# Patient Record
Sex: FEMALE | Race: WHITE | Employment: FULL TIME | URBAN - METROPOLITAN AREA
[De-identification: names, ages, dates, MRNs, and addresses within clinical notes are randomized per-mention and may not be internally consistent; named-entity substitution may affect disease eponyms.]

---

## 2017-07-11 ENCOUNTER — HOSPITAL ENCOUNTER (OUTPATIENT)
Dept: CT IMAGING | Age: 62
Discharge: HOME | End: 2017-07-11
Attending: SPECIALIST
Payer: COMMERCIAL

## 2017-07-11 DIAGNOSIS — J32.0 CHRONIC MAXILLARY SINUSITIS: ICD-10-CM

## 2017-07-11 DIAGNOSIS — R05.9 COUGH: ICD-10-CM

## 2017-07-11 PROCEDURE — 70486 CT MAXILLOFACIAL W/O DYE: CPT

## 2017-08-01 ENCOUNTER — HOSPITAL ENCOUNTER (OUTPATIENT)
Dept: GENERAL RADIOLOGY | Age: 62
Discharge: HOME | End: 2017-08-01
Payer: COMMERCIAL

## 2017-08-01 DIAGNOSIS — R93.89 CHEST X-RAY ABNORMALITY: ICD-10-CM

## 2017-08-01 PROCEDURE — 71020 XR CHEST PA LAT: CPT

## 2018-07-30 ENCOUNTER — HOSPITAL ENCOUNTER (OUTPATIENT)
Dept: GENERAL RADIOLOGY | Age: 63
Discharge: HOME | End: 2018-07-30
Payer: COMMERCIAL

## 2018-07-30 DIAGNOSIS — C64.9 RENAL CELL CARCINOMA (HCC): ICD-10-CM

## 2018-07-30 PROCEDURE — 71046 X-RAY EXAM CHEST 2 VIEWS: CPT

## 2022-01-13 ENCOUNTER — TRANSCRIBE ORDER (OUTPATIENT)
Dept: SCHEDULING | Age: 67
End: 2022-01-13

## 2022-01-13 DIAGNOSIS — I48.0 PAROXYSMAL ATRIAL FIBRILLATION (HCC): Primary | ICD-10-CM

## 2022-02-09 ENCOUNTER — HOSPITAL ENCOUNTER (OUTPATIENT)
Dept: MRI IMAGING | Age: 67
Discharge: HOME | End: 2022-02-09
Attending: NURSE PRACTITIONER
Payer: COMMERCIAL

## 2022-02-09 VITALS — WEIGHT: 187 LBS

## 2022-02-09 DIAGNOSIS — I48.0 PAROXYSMAL ATRIAL FIBRILLATION (HCC): ICD-10-CM

## 2022-02-09 PROCEDURE — A9576 INJ PROHANCE MULTIPACK: HCPCS | Performed by: INTERNAL MEDICINE

## 2022-02-09 PROCEDURE — 77030021566

## 2022-02-09 PROCEDURE — 74011250636 HC RX REV CODE- 250/636: Performed by: INTERNAL MEDICINE

## 2022-02-09 PROCEDURE — 75561 CARDIAC MRI FOR MORPH W/DYE: CPT

## 2022-02-09 RX ADMIN — GADOTERIDOL 25 ML: 279.3 INJECTION, SOLUTION INTRAVENOUS at 11:43

## 2023-04-18 PROBLEM — I48.19 PERSISTENT ATRIAL FIBRILLATION (HCC): Status: ACTIVE | Noted: 2023-04-18

## 2023-04-19 ENCOUNTER — OFFICE VISIT (OUTPATIENT)
Dept: CARDIOLOGY CLINIC | Age: 68
End: 2023-04-19

## 2023-04-19 VITALS
OXYGEN SATURATION: 98 % | SYSTOLIC BLOOD PRESSURE: 110 MMHG | WEIGHT: 196.4 LBS | HEIGHT: 69 IN | BODY MASS INDEX: 29.09 KG/M2 | HEART RATE: 86 BPM | DIASTOLIC BLOOD PRESSURE: 80 MMHG | RESPIRATION RATE: 18 BRPM

## 2023-04-19 DIAGNOSIS — Z79.01 ANTICOAGULATION ADEQUATE: ICD-10-CM

## 2023-04-19 DIAGNOSIS — Z86.73 HISTORY OF CVA (CEREBROVASCULAR ACCIDENT): ICD-10-CM

## 2023-04-19 DIAGNOSIS — G71.00 MUSCULAR DYSTROPHY (HCC): ICD-10-CM

## 2023-04-19 DIAGNOSIS — I48.19 PERSISTENT ATRIAL FIBRILLATION (HCC): ICD-10-CM

## 2023-04-19 RX ORDER — LYSINE HCL 500 MG
1 TABLET ORAL DAILY
COMMUNITY

## 2023-04-19 RX ORDER — CYCLOSPORINE 0.5 MG/ML
1 EMULSION OPHTHALMIC 2 TIMES DAILY
COMMUNITY

## 2023-04-19 RX ORDER — GLUCOSAMINE SULFATE 1500 MG
POWDER IN PACKET (EA) ORAL DAILY
COMMUNITY

## 2023-04-19 RX ORDER — FLUTICASONE PROPIONATE 50 MCG
1 SPRAY, SUSPENSION (ML) NASAL AS NEEDED
COMMUNITY

## 2023-04-19 RX ORDER — ALENDRONATE SODIUM 70 MG/1
70 TABLET ORAL
COMMUNITY
Start: 2023-02-25

## 2023-04-19 RX ORDER — RIVAROXABAN 20 MG/1
20 TABLET, FILM COATED ORAL DAILY
COMMUNITY
Start: 2023-04-05

## 2023-04-19 RX ORDER — DICLOFENAC SODIUM 10 MG/G
4 GEL TOPICAL DAILY PRN
COMMUNITY

## 2023-04-19 RX ORDER — ATORVASTATIN CALCIUM 20 MG/1
20 TABLET, FILM COATED ORAL DAILY
COMMUNITY
Start: 2023-03-17

## 2023-04-19 RX ORDER — AZELASTINE 1 MG/ML
1 SPRAY, METERED NASAL 2 TIMES DAILY
COMMUNITY

## 2023-04-19 RX ORDER — MONTELUKAST SODIUM 10 MG/1
10 TABLET ORAL DAILY
COMMUNITY
Start: 2023-02-07

## 2023-04-19 NOTE — PATIENT INSTRUCTIONS
Obtain 1 week event monitor in May  Continue Xarelto 20 mg daily  Please call us if you are interested in the Watchman procedure  FU with Dr. Sadiq Smart in 6 months

## 2023-04-19 NOTE — PROGRESS NOTES
Room #: 2    C/o fatigue and shortness of breath    Chief Complaint   Patient presents with    New Patient     From [facility]    Irregular Heart Beat     AFIB    Cerebrovascular Accident       Visit Vitals  /80 (BP 1 Location: Left upper arm, BP Patient Position: Sitting, BP Cuff Size: Adult)   Pulse 86   Resp 18   Ht 5' 9\" (1.753 m)   Wt 196 lb 6.4 oz (89.1 kg)   LMP 01/18/2012   SpO2 98%   BMI 29.00 kg/m²         Chest pain:  NO  Shortness of breath:  YES  Edema: NO  Palpitations, skipped beats, rapid heartbeat:  Rarely  Dizziness:  NO    1. Have you been to the ER, urgent care clinic since your last visit? Hospitalized since your last visit? NO    2. Have you seen or consulted any other health care providers outside of the [address] [place] [address] since your last visit? Include any pap smears or colon screening.  NO      Refills:  NO

## 2023-04-19 NOTE — PROGRESS NOTES
Cardiac Electrophysiology OFFICE Consultation Note       Assessment/Plan:   1. Persistent atrial fibrillation (HCC)  -     AMB POC EKG ROUTINE W/ 12 LEADS, INTER & REP  -     AMB POC EKG ROUTINE W/ 12 LEADS, INTER & REP  2. Muscular dystrophy ([address]  3. History of CVA (cerebrovascular accident)  4. Anticoagulation adequate       Persistent atrial fibrillation  History of persistent atrial fibrillation complicated by acute CVA. Unclear duration of A-fib prior to hospitalization in January 2022. She ultimately had ongoing breakthrough arrhythmia despite flecainide therapy and eventually amiodarone therapy. - Underwent PVI, CTI ablation on 6/21/2022  - Bradycardia and Wenkebach limits long-term beta-blockade  - I'm pleased to hear how well the patient has done post ablation. We spoke in great detail regarding the importance of multidisciplinary management of AFib and the role of risk factors modification in preventing recurrent AF including focusing on diet, weight loss, control of blood pressure, glycemic control, AURORA diagnosis and treatment, and medication compliance. - No longer on amiodarone since 9/22/2022  - Prior sleep study negative for sleep apnea  - Continue Xarelto 20 mg daily. She is dealing with the high cost of Xarelto as well as in the history of muscular dystrophy, elevated risk of falls  - Idea of watchman was explained to the patient in great details and a pamphlet was provided  - She can call us if she wants to proceed with watchman implant  - Obtain 1 week event monitor in May  - Follow-up with me in 6 months    Anticoagulation  On Xarelto for thromboembolic prophylaxis. CHADS-VASc score of 4 (CVA, female, Age>65)  -Will need indefinite anticoagulation unless she proceed with watchman implant    Muscular dystrophy  Well-controlled.   No evidence of dilated cardiomyopathy from cardiac MRI done in 2022    History of CVA  Secondary to atrial fibrillation fortunate to not have significant residual deficits status post CVA and tPA  - Continue Xarelto for thromboembolic prophylaxis    Subjective:       Lori Brock is a 74 y.o. patient who is seen for evaluation of  AF. Prior patient of [facility], transitioning care to [facility]. She has a prior history of CVA status post thrombolysis in January 2022. History of persistent atrial fibrillation status post prior PVI, CTI and epicardial PV connection ablation in the right lesley on 6/21/2022. She otherwise has been doing quite well. Walks with a walker due to her muscular dystrophy. Denies of any chest pain. Reports occasional shortness of breath with exertion, LVEF last noted to be 55% last year. Occasional nighttime palpitations. No longer on antiarrhythmic therapy. She is dealing with high cost of Xarelto. I independently review internal and external records of most recent labs, EKGs, event monitors or Holters, and imaging including most recent echocardiograms and stress test.   -S records reviewed  - Acute CVA underwent thrombolysis in 2022  - Cardiac MRI demonstrated LVEF of 55%, mild mitral regurgitation, mild to moderate tricuspid regurgitation, evidence of late gadolinium enhancement in the focal area of subendocardial basal inferior lateral, nonischemic. Patient Active Problem List   Diagnosis Code    Renal mass, right N28.89    Persistent atrial fibrillation (HCC) I48.19    Muscular dystrophy ([facility] [address] G71.00    History of CVA (cerebrovascular accident) Z86.73    Anticoagulation adequate Z79.01     Current Outpatient Medications   Medication Sig Dispense Refill    atorvastatin (LIPITOR) 20 mg tablet Take 1 Tablet by mouth daily. azelastine (ASTELIN) 137 mcg (0.1 %) nasal spray 1 Spray two (2) times a day. montelukast (SINGULAIR) 10 mg tablet Take 1 Tablet by mouth daily. Xarelto 20 mg tab tablet Take 1 Tablet by mouth daily. cycloSPORINE (RESTASIS) 0.05 % dpet Administer 1 Drop to both eyes two (2) times a day. psyllium (METAMUCIL) powd Take 1 Scoop by mouth daily. TURMERIC PO Take 1,000 mg by mouth daily. diclofenac (VOLTAREN) 1 % gel Apply 4 g to affected area daily as needed. Lactobacillus acidophilus (PROBIOTIC PO) Take 1 Capsule by mouth daily. cholecalciferol (VITAMIN D3) 25 mcg (1,000 unit) cap Take  by mouth daily. MULTIVITAMIN WITH MINERALS (ONE-A-DAY 50 PLUS PO) Take 1 Cap by mouth daily. acetaminophen (TYLENOL) 500 mg tablet Take 650 mg by mouth every six (6) hours as needed. fluticasone propionate (FLONASE) 50 mcg/actuation nasal spray 1 Spray by Nasal route as needed. Calcium Carbonate-Vit D3-Min 600 mg calcium- 400 unit tab Take 1 Tablet by mouth daily. alendronate (FOSAMAX) 70 mg tablet Take 1 Tablet by mouth every seven (7) days. Allergies   Allergen Reactions    Other Medication Itching     Crab   States she is able to tolerate iodine contrast    Sulfa (Sulfonamide Antibiotics) Itching     Past Medical History:   Diagnosis Date    Anomaly of coronary artery     LAD and left circumflex arterhy have an anomolous origin from the right coronary cusp    Endometriosis     Muscular dystrophy ([address]     [facility], mild per patient. Dry eyes    Unspecified adverse effect of anesthesia     post op orthostasis     Past Surgical History:   Procedure Laterality Date    HX GYN      laparoscopy x 2 for endometriosis    HX OTHER SURGICAL      muscle biopsy left thigh    OR UNLISTED PROCEDURE CARDIAC SURGERY  12/2008    cardiac cath neg     Family History   Problem Relation Age of Onset    Hypertension Mother     Elevated Lipids Mother     Hypertension Father     Elevated Lipids Father      Social History     Tobacco Use    Smoking status: Former     Packs/day: 0.25     Years: 0.50     Pack years: 0.13     Types: Cigarettes    Smokeless tobacco: Not on file   Substance Use Topics    Alcohol use:  Yes     Alcohol/week: 0.8 standard drinks     Types: 1 Glasses of wine per week     Comment: once/month        Review of Systems:   12 point review of systems was performed. All negative except for HPI     Objective:   /80 (BP 1 Location: Left upper arm, BP Patient Position: Sitting, BP Cuff Size: Adult)   Pulse 86   Resp 18   Ht 5' 9\" (1.753 m)   Wt 196 lb 6.4 oz (89.1 kg)   LMP 01/18/2012   SpO2 98%   BMI 29.00 kg/m²     Physical Exam:   General:  Alert and oriented, in no acute distress  Head:  Atraumatic, normocephalic  Eyes:  extraocular muscles intact  Neck:  Supple, normal range of motion  Lungs:  Clear to auscultation bilaterally, no wheezes/rales/rhonchi   Cardiovascular:  Regular rate and rhythm, normal S1-S2, no murmurs/rubs/gallops  Abdomen:  Soft, nontender, nondistended, normoactive bowel sounds  Skin:  Intact, no rash  Extremities:, no clubbing, cyanosis, or edema  Musculoskeletal: normal range of motion  Neurological:  Alert and oriented, no focal neurologic deficits  Psychiatric:  Normal mood and affect    No results found for: HBA1C, WIX9OBHG, NRE6NTIR, FMU5HZDL  EKG: sinus rhythm, normal axis, rate of 76 bpm, Qtc 426 ms        Results for orders placed or performed during the hospital encounter of 01/26/12   EKG, 12 LEAD, INITIAL   Result Value Ref Range    Ventricular Rate 82 BPM    Atrial Rate 82 BPM    P-R Interval 238 ms    QRS Duration 74 ms    Q-T Interval 358 ms    QTC Calculation (Bezet) 418 ms    Calculated P Axis 73 degrees    Calculated R Axis 69 degrees    Calculated T Axis 76 degrees    Diagnosis       Sinus rhythm with 1st degree AV block  Biatrial enlargement  ST abnormality, possible digitalis effect  Abnormal ECG  No previous ECGs available  Confirmed by Asya CHAVES, Irene Luis ([id number]) on 1/26/2012 5:43:03 PM             Thank you for involving me in this patient's care and please call with further concerns or questions.       ________________________________________  An Dom Barr MD, [facility], [facility]  Cardiac Electrophysiology  [facility]  [address] [facility], [address] [place] [address] Avenue                             [phone]     [address] [place] [address].  [address] [place] [address], [address] [facility]  [phone]

## 2023-04-25 ENCOUNTER — TELEPHONE (OUTPATIENT)
Dept: CARDIOLOGY CLINIC | Age: 68
End: 2023-04-25

## 2023-04-25 NOTE — TELEPHONE ENCOUNTER
Enrolled with [employer] - Ordered and being shipped to patient's home address on file. ETA within 5-7 business days.     Previous Messages       ----- Message -----   From: Gerber Pappas RN   Sent: 4/19/2023   9:08 AM EDT   To: Roxanne Álvarez     1 week event monitor in May please :) - AFIB

## 2023-05-25 ENCOUNTER — TELEPHONE (OUTPATIENT)
Age: 68
End: 2023-05-25

## 2023-05-26 ENCOUNTER — PATIENT MESSAGE (OUTPATIENT)
Age: 68
End: 2023-05-26

## 2023-05-26 NOTE — TELEPHONE ENCOUNTER
4 Day Event monitor (5/2/23-5/6/23)  HR beween  bpm  Significant atrial flutter seen with burden of 56.9%, longest episode lasting for 2 days and 1 hour. 67 episodes of pause seen, longest duration of 2.6 seconds. Occasional PAC of 1.85%  Rare PVCs 0.18%  No High grade AV block seen  No VT      Results will be given in clinic.

## 2023-05-30 NOTE — TELEPHONE ENCOUNTER
An MD Georgina Yanez, RN; [facility]  [facility],     It looks like her monitor demonstrated evidence of atrial flutter. Can we bring her into clinic in June to discuss this further, thanks. Returned patient call, ID verified using two patient identifiers. Notified of above results and recommendations. Follow up appointment scheduled. Patient verbalized understanding and will call back with any other questions or concerns.     Future Appointments   Date Time Provider Pio Vasquez   6/14/2023  8:40 AM An MD BUTCH Yanez AMB   10/20/2023  8:20 AM An MD BUTCH Yanez AMB

## 2023-06-14 ENCOUNTER — TELEPHONE (OUTPATIENT)
Age: 68
End: 2023-06-14

## 2023-06-14 NOTE — TELEPHONE ENCOUNTER
Spoke with Pt of Afib/Aflutter Ablation w/Dr. Yamilet Villalba at [facility]. For 8/30/23 at 8am arrive at 6:15am  Pt aware that they need a  NPO from [address] the night before. Check in at the First floor Outpt. Reg. Desk. Pt is to have Labs done on 7/30/23-8/23/23.  Medications:  Medications are ok to take    VO by /nurse Yane Jamil.

## 2023-07-05 ENCOUNTER — TELEPHONE (OUTPATIENT)
Age: 68
End: 2023-07-05

## 2023-07-05 RX ORDER — RIVAROXABAN 20 MG/1
20 TABLET, FILM COATED ORAL
Qty: 90 TABLET | Refills: 1 | Status: SHIPPED | OUTPATIENT
Start: 2023-07-05

## 2023-07-05 NOTE — TELEPHONE ENCOUNTER
Requested Prescriptions     Signed Prescriptions Disp Refills    XARELTO 20 MG TABS tablet 90 tablet 1     Sig: Take 1 tablet by mouth Daily with supper     Authorizing Provider: NANCY ALVARADO     Ordering User: Tree Patel     Refill per verbal order Dr. Garry Yanez.    Last visit:6/14/23  Next visit: 10/6/23

## 2023-07-05 NOTE — TELEPHONE ENCOUNTER
Pt called for refill of Xarelto 20mg, she's low & will not make it through the weekend. Her pharmacy is [facility].       [facility]# [phone]

## 2023-07-19 ENCOUNTER — PREP FOR PROCEDURE (OUTPATIENT)
Age: 68
End: 2023-07-19

## 2023-07-20 RX ORDER — SODIUM CHLORIDE 0.9 % (FLUSH) 0.9 %
5-40 SYRINGE (ML) INJECTION EVERY 12 HOURS SCHEDULED
Status: CANCELLED | OUTPATIENT
Start: 2023-07-20

## 2023-07-20 RX ORDER — SODIUM CHLORIDE 9 MG/ML
INJECTION, SOLUTION INTRAVENOUS PRN
Status: CANCELLED | OUTPATIENT
Start: 2023-07-20

## 2023-07-20 RX ORDER — SODIUM CHLORIDE 0.9 % (FLUSH) 0.9 %
5-40 SYRINGE (ML) INJECTION PRN
Status: CANCELLED | OUTPATIENT
Start: 2023-07-20

## 2023-08-11 LAB
BASOPHILS # BLD AUTO: 0.1 X10E3/UL (ref 0–0.2)
BASOPHILS NFR BLD AUTO: 1 %
BUN SERPL-MCNC: 22 MG/DL (ref 8–27)
BUN/CREAT SERPL: 35 (ref 12–28)
CALCIUM SERPL-MCNC: 9.6 MG/DL (ref 8.7–10.3)
CHLORIDE SERPL-SCNC: 106 MMOL/L (ref 96–106)
CO2 SERPL-SCNC: 22 MMOL/L (ref 20–29)
CREAT SERPL-MCNC: 0.62 MG/DL (ref 0.57–1)
EGFRCR SERPLBLD CKD-EPI 2021: 99 ML/MIN/1.73
EOSINOPHIL # BLD AUTO: 0.2 X10E3/UL (ref 0–0.4)
EOSINOPHIL NFR BLD AUTO: 2 %
ERYTHROCYTE [DISTWIDTH] IN BLOOD BY AUTOMATED COUNT: 12.9 % (ref 11.7–15.4)
GLUCOSE SERPL-MCNC: 90 MG/DL (ref 70–99)
HCT VFR BLD AUTO: 40.7 % (ref 34–46.6)
HGB BLD-MCNC: 13.4 G/DL (ref 11.1–15.9)
IMM GRANULOCYTES # BLD AUTO: 0.1 X10E3/UL (ref 0–0.1)
IMM GRANULOCYTES NFR BLD AUTO: 1 %
LYMPHOCYTES # BLD AUTO: 2.5 X10E3/UL (ref 0.7–3.1)
LYMPHOCYTES NFR BLD AUTO: 22 %
MCH RBC QN AUTO: 28 PG (ref 26.6–33)
MCHC RBC AUTO-ENTMCNC: 32.9 G/DL (ref 31.5–35.7)
MCV RBC AUTO: 85 FL (ref 79–97)
MONOCYTES # BLD AUTO: 1 X10E3/UL (ref 0.1–0.9)
MONOCYTES NFR BLD AUTO: 8 %
NEUTROPHILS # BLD AUTO: 7.5 X10E3/UL (ref 1.4–7)
NEUTROPHILS NFR BLD AUTO: 66 %
PLATELET # BLD AUTO: 350 X10E3/UL (ref 150–450)
POTASSIUM SERPL-SCNC: 5 MMOL/L (ref 3.5–5.2)
RBC # BLD AUTO: 4.78 X10E6/UL (ref 3.77–5.28)
SODIUM SERPL-SCNC: 143 MMOL/L (ref 134–144)
WBC # BLD AUTO: 11.3 X10E3/UL (ref 3.4–10.8)

## 2023-08-28 NOTE — DISCHARGE INSTRUCTIONS
Atrial Fibrillation Ablation   Discharge Instructions      You have just had an Atrial Fibrillation Ablation. There were catheters temporarily placed in your heart through a puncture in the veins and/or arteries in your groin. WHAT TO EXPECT     If you have had an Atrial Fibrillation Ablation please be aware that you may experience mild chest pain that will resolve within 24-48 hours. If the chest pain persists or becomes severe, please call the office. Mild to moderate, non-painful, bruising or mild swelling at the puncture site is not un-common, and will resolve in 7 - 14 days, and may extend down your thigh as it heals. Application of Ice to the site may help with any tenderness. You have a small gauze dressing applied to the puncture site in your groin. You may remove this the following morning. It is not uncommon to feel palpitations during the healing phase after your ablation. If you feel as though you are having recurrence of atrial fibrillation lasting longer than 30 minutes, please contact the office. Palpitations/AFIB can occur during the healing phase (1-2 months) post ablation. MEDICATIONS     Stop Nexium & start pantoprazole 40 mg by mouth twice daily x 30 days & Carafate 1g by mouth 4 times daily x 30 days, then restart Nexium. Start amiodarone 400 mg by mouth daily x 2 weeks, then 200 mg by mouth daily thereafter. If you develop chest pain, you may start colchicine 0.6 mg by mouth twice daily as needed. Resume other medications as previously prescribed. ACTIVITY     A responsible adult must take you home. Do not drive a car for 24 hours. Rest quietly for the remainder of the day. Do not lift anything greater than 10 pounds for 7 days. Limit bending at the puncture site and use of stairs for at least 2 days. You may remove the bandage and shower the morning after the procedure. Do not take a bath for 3 days.         SYMPTOMS THAT NEED TO BE REPORTED

## 2023-08-29 ENCOUNTER — TELEPHONE (OUTPATIENT)
Age: 68
End: 2023-08-29

## 2023-08-29 ENCOUNTER — ANESTHESIA EVENT (OUTPATIENT)
Facility: HOSPITAL | Age: 68
End: 2023-08-29
Payer: MEDICARE

## 2023-08-29 NOTE — TELEPHONE ENCOUNTER
Spoke To Pt:  Just a reminder not to forget your Afib Ablation at [facility] w/Dr. Ally Gallardo scheduled for tomorrow. Arriving at 6:15am  You will need a    NPO from midnight   check in at the first floor outpt. reg. Desk.   Medications:  Ok to take  VO by [facility].

## 2023-08-30 ENCOUNTER — HOSPITAL ENCOUNTER (OUTPATIENT)
Facility: HOSPITAL | Age: 68
Discharge: HOME | End: 2023-08-30
Attending: INTERNAL MEDICINE | Admitting: INTERNAL MEDICINE
Payer: MEDICARE

## 2023-08-30 ENCOUNTER — ANESTHESIA (OUTPATIENT)
Facility: HOSPITAL | Age: 68
End: 2023-08-30
Payer: MEDICARE

## 2023-08-30 VITALS
HEIGHT: 69 IN | TEMPERATURE: 97.7 F | DIASTOLIC BLOOD PRESSURE: 86 MMHG | SYSTOLIC BLOOD PRESSURE: 123 MMHG | OXYGEN SATURATION: 100 % | RESPIRATION RATE: 13 BRPM | HEART RATE: 65 BPM | BODY MASS INDEX: 28.14 KG/M2 | WEIGHT: 190 LBS

## 2023-08-30 DIAGNOSIS — I48.91 ATRIAL FIBRILLATION, UNSPECIFIED TYPE (HCC): ICD-10-CM

## 2023-08-30 LAB
ABO + RH BLD: NORMAL
ACT BLD: 323 SECS (ref 79–138)
ACT BLD: 329 SECS (ref 79–138)
ACT BLD: 342 SECS (ref 79–138)
ACT BLD: 354 SECS (ref 79–138)
BLOOD GROUP ANTIBODIES SERPL: NORMAL
ECHO BSA: 2.05 M2
EKG ATRIAL RATE: 67 BPM
EKG DIAGNOSIS: NORMAL
EKG P AXIS: 77 DEGREES
EKG P-R INTERVAL: 226 MS
EKG Q-T INTERVAL: 450 MS
EKG QRS DURATION: 80 MS
EKG QTC CALCULATION (BAZETT): 475 MS
EKG R AXIS: 63 DEGREES
EKG T AXIS: 93 DEGREES
EKG VENTRICULAR RATE: 67 BPM
SPECIMEN EXP DATE BLD: NORMAL

## 2023-08-30 PROCEDURE — 86900 BLOOD TYPING SEROLOGIC ABO: CPT

## 2023-08-30 PROCEDURE — 85347 COAGULATION TIME ACTIVATED: CPT

## 2023-08-30 PROCEDURE — 93622 COMP EP EVAL L VENTR PAC&REC: CPT | Performed by: INTERNAL MEDICINE

## 2023-08-30 PROCEDURE — 3700000000 HC ANESTHESIA ATTENDED CARE: Performed by: INTERNAL MEDICINE

## 2023-08-30 PROCEDURE — 92960 CARDIOVERSION ELECTRIC EXT: CPT | Performed by: INTERNAL MEDICINE

## 2023-08-30 PROCEDURE — 2580000003 HC RX 258: Performed by: ANESTHESIOLOGY

## 2023-08-30 PROCEDURE — 6360000002 HC RX W HCPCS: Performed by: ANESTHESIOLOGY

## 2023-08-30 PROCEDURE — 93656 COMPRE EP EVAL ABLTJ ATR FIB: CPT | Performed by: INTERNAL MEDICINE

## 2023-08-30 PROCEDURE — 93655 ICAR CATH ABLTJ DSCRT ARRHYT: CPT | Performed by: INTERNAL MEDICINE

## 2023-08-30 PROCEDURE — 93657 TX L/R ATRIAL FIB ADDL: CPT | Performed by: INTERNAL MEDICINE

## 2023-08-30 PROCEDURE — C1894 INTRO/SHEATH, NON-LASER: HCPCS | Performed by: INTERNAL MEDICINE

## 2023-08-30 PROCEDURE — 2709999900 HC NON-CHARGEABLE SUPPLY: Performed by: INTERNAL MEDICINE

## 2023-08-30 PROCEDURE — 93662 INTRACARDIAC ECG (ICE): CPT | Performed by: INTERNAL MEDICINE

## 2023-08-30 PROCEDURE — 76937 US GUIDE VASCULAR ACCESS: CPT | Performed by: INTERNAL MEDICINE

## 2023-08-30 PROCEDURE — 86901 BLOOD TYPING SEROLOGIC RH(D): CPT

## 2023-08-30 PROCEDURE — 3700000001 HC ADD 15 MINUTES (ANESTHESIA): Performed by: INTERNAL MEDICINE

## 2023-08-30 PROCEDURE — 36415 COLL VENOUS BLD VENIPUNCTURE: CPT

## 2023-08-30 PROCEDURE — 86850 RBC ANTIBODY SCREEN: CPT

## 2023-08-30 PROCEDURE — C1759 CATH, INTRA ECHOCARDIOGRAPHY: HCPCS | Performed by: INTERNAL MEDICINE

## 2023-08-30 PROCEDURE — C1732 CATH, EP, DIAG/ABL, 3D/VECT: HCPCS | Performed by: INTERNAL MEDICINE

## 2023-08-30 PROCEDURE — C1760 CLOSURE DEV, VASC: HCPCS | Performed by: INTERNAL MEDICINE

## 2023-08-30 PROCEDURE — 2720000010 HC SURG SUPPLY STERILE: Performed by: INTERNAL MEDICINE

## 2023-08-30 PROCEDURE — 93005 ELECTROCARDIOGRAM TRACING: CPT | Performed by: NURSE PRACTITIONER

## 2023-08-30 PROCEDURE — C1766 INTRO/SHEATH,STRBLE,NON-PEEL: HCPCS | Performed by: INTERNAL MEDICINE

## 2023-08-30 PROCEDURE — 93623 PRGRMD STIMJ&PACG IV RX NFS: CPT | Performed by: INTERNAL MEDICINE

## 2023-08-30 PROCEDURE — 93613 INTRACARDIAC EPHYS 3D MAPG: CPT | Performed by: INTERNAL MEDICINE

## 2023-08-30 PROCEDURE — 2500000003 HC RX 250 WO HCPCS: Performed by: ANESTHESIOLOGY

## 2023-08-30 RX ORDER — SODIUM CHLORIDE 0.9 % (FLUSH) 0.9 %
5-40 SYRINGE (ML) INJECTION PRN
Status: DISCONTINUED | OUTPATIENT
Start: 2023-08-30 | End: 2023-08-30 | Stop reason: HOSPADM

## 2023-08-30 RX ORDER — FENTANYL CITRATE 50 UG/ML
100 INJECTION, SOLUTION INTRAMUSCULAR; INTRAVENOUS
Status: DISCONTINUED | OUTPATIENT
Start: 2023-08-30 | End: 2023-08-30 | Stop reason: HOSPADM

## 2023-08-30 RX ORDER — SODIUM CHLORIDE 9 MG/ML
INJECTION, SOLUTION INTRAVENOUS PRN
Status: DISCONTINUED | OUTPATIENT
Start: 2023-08-30 | End: 2023-08-30 | Stop reason: HOSPADM

## 2023-08-30 RX ORDER — ONDANSETRON 2 MG/ML
4 INJECTION INTRAMUSCULAR; INTRAVENOUS
Status: DISCONTINUED | OUTPATIENT
Start: 2023-08-30 | End: 2023-08-30 | Stop reason: HOSPADM

## 2023-08-30 RX ORDER — HEPARIN SODIUM 1000 [USP'U]/ML
INJECTION, SOLUTION INTRAVENOUS; SUBCUTANEOUS PRN
Status: DISCONTINUED | OUTPATIENT
Start: 2023-08-30 | End: 2023-08-30 | Stop reason: SDUPTHER

## 2023-08-30 RX ORDER — ONDANSETRON 2 MG/ML
INJECTION INTRAMUSCULAR; INTRAVENOUS PRN
Status: DISCONTINUED | OUTPATIENT
Start: 2023-08-30 | End: 2023-08-30 | Stop reason: SDUPTHER

## 2023-08-30 RX ORDER — NEOSTIGMINE METHYLSULFATE 1 MG/ML
INJECTION, SOLUTION INTRAVENOUS PRN
Status: DISCONTINUED | OUTPATIENT
Start: 2023-08-30 | End: 2023-08-30 | Stop reason: SDUPTHER

## 2023-08-30 RX ORDER — MIDAZOLAM HYDROCHLORIDE 2 MG/2ML
2 INJECTION, SOLUTION INTRAMUSCULAR; INTRAVENOUS
Status: DISCONTINUED | OUTPATIENT
Start: 2023-08-30 | End: 2023-08-30 | Stop reason: HOSPADM

## 2023-08-30 RX ORDER — PROTAMINE SULFATE 10 MG/ML
INJECTION, SOLUTION INTRAVENOUS PRN
Status: DISCONTINUED | OUTPATIENT
Start: 2023-08-30 | End: 2023-08-30 | Stop reason: SDUPTHER

## 2023-08-30 RX ORDER — SODIUM CHLORIDE 9 MG/ML
INJECTION, SOLUTION INTRAVENOUS CONTINUOUS PRN
Status: DISCONTINUED | OUTPATIENT
Start: 2023-08-30 | End: 2023-08-30 | Stop reason: SDUPTHER

## 2023-08-30 RX ORDER — PROCHLORPERAZINE EDISYLATE 5 MG/ML
5 INJECTION INTRAMUSCULAR; INTRAVENOUS
Status: DISCONTINUED | OUTPATIENT
Start: 2023-08-30 | End: 2023-08-30 | Stop reason: HOSPADM

## 2023-08-30 RX ORDER — GLYCOPYRROLATE 0.2 MG/ML
INJECTION INTRAMUSCULAR; INTRAVENOUS PRN
Status: DISCONTINUED | OUTPATIENT
Start: 2023-08-30 | End: 2023-08-30 | Stop reason: SDUPTHER

## 2023-08-30 RX ORDER — OXYCODONE HYDROCHLORIDE 5 MG/1
5 TABLET ORAL
Status: DISCONTINUED | OUTPATIENT
Start: 2023-08-30 | End: 2023-08-30 | Stop reason: HOSPADM

## 2023-08-30 RX ORDER — LIDOCAINE HYDROCHLORIDE 20 MG/ML
INJECTION, SOLUTION EPIDURAL; INFILTRATION; INTRACAUDAL; PERINEURAL PRN
Status: DISCONTINUED | OUTPATIENT
Start: 2023-08-30 | End: 2023-08-30 | Stop reason: SDUPTHER

## 2023-08-30 RX ORDER — DICYCLOMINE HCL 20 MG
20 TABLET ORAL EVERY 6 HOURS
COMMUNITY

## 2023-08-30 RX ORDER — SODIUM CHLORIDE 0.9 % (FLUSH) 0.9 %
5-40 SYRINGE (ML) INJECTION EVERY 12 HOURS SCHEDULED
Status: DISCONTINUED | OUTPATIENT
Start: 2023-08-30 | End: 2023-08-30 | Stop reason: HOSPADM

## 2023-08-30 RX ORDER — ROCURONIUM BROMIDE 10 MG/ML
INJECTION, SOLUTION INTRAVENOUS PRN
Status: DISCONTINUED | OUTPATIENT
Start: 2023-08-30 | End: 2023-08-30 | Stop reason: SDUPTHER

## 2023-08-30 RX ORDER — SODIUM CHLORIDE, SODIUM LACTATE, POTASSIUM CHLORIDE, CALCIUM CHLORIDE 600; 310; 30; 20 MG/100ML; MG/100ML; MG/100ML; MG/100ML
INJECTION, SOLUTION INTRAVENOUS CONTINUOUS
Status: DISCONTINUED | OUTPATIENT
Start: 2023-08-30 | End: 2023-08-30 | Stop reason: HOSPADM

## 2023-08-30 RX ORDER — ACETAMINOPHEN 325 MG/1
650 TABLET ORAL EVERY 4 HOURS PRN
Status: DISCONTINUED | OUTPATIENT
Start: 2023-08-30 | End: 2023-08-30 | Stop reason: HOSPADM

## 2023-08-30 RX ORDER — ESOMEPRAZOLE MAGNESIUM 20 MG/1
20 FOR SUSPENSION ORAL DAILY
Qty: 30 PACKET | Refills: 3 | Status: SHIPPED
Start: 2023-09-30

## 2023-08-30 RX ORDER — PANTOPRAZOLE SODIUM 40 MG/1
40 TABLET, DELAYED RELEASE ORAL
Qty: 60 TABLET | Refills: 0 | Status: SHIPPED | OUTPATIENT
Start: 2023-08-30 | End: 2023-09-29

## 2023-08-30 RX ORDER — POLYETHYLENE GLYCOL 3350 17 G/17G
17 POWDER, FOR SOLUTION ORAL DAILY
COMMUNITY

## 2023-08-30 RX ORDER — FENTANYL CITRATE 50 UG/ML
25 INJECTION, SOLUTION INTRAMUSCULAR; INTRAVENOUS EVERY 5 MIN PRN
Status: DISCONTINUED | OUTPATIENT
Start: 2023-08-30 | End: 2023-08-30 | Stop reason: HOSPADM

## 2023-08-30 RX ORDER — HEPARIN SODIUM 10000 [USP'U]/100ML
INJECTION, SOLUTION INTRAVENOUS CONTINUOUS PRN
Status: DISCONTINUED | OUTPATIENT
Start: 2023-08-30 | End: 2023-08-30 | Stop reason: SDUPTHER

## 2023-08-30 RX ORDER — LIDOCAINE HYDROCHLORIDE 10 MG/ML
1 INJECTION, SOLUTION EPIDURAL; INFILTRATION; INTRACAUDAL; PERINEURAL
Status: DISCONTINUED | OUTPATIENT
Start: 2023-08-30 | End: 2023-08-30 | Stop reason: HOSPADM

## 2023-08-30 RX ORDER — IPRATROPIUM BROMIDE 42 UG/1
2 SPRAY, METERED NASAL 4 TIMES DAILY
COMMUNITY

## 2023-08-30 RX ORDER — HYDRALAZINE HYDROCHLORIDE 20 MG/ML
10 INJECTION INTRAMUSCULAR; INTRAVENOUS
Status: DISCONTINUED | OUTPATIENT
Start: 2023-08-30 | End: 2023-08-30 | Stop reason: HOSPADM

## 2023-08-30 RX ORDER — AMIODARONE HYDROCHLORIDE 200 MG/1
TABLET ORAL
Qty: 60 TABLET | Refills: 1 | Status: SHIPPED | OUTPATIENT
Start: 2023-08-30

## 2023-08-30 RX ORDER — SUCRALFATE ORAL 1 G/10ML
1 SUSPENSION ORAL 4 TIMES DAILY
Qty: 1200 ML | Refills: 0 | Status: SHIPPED | OUTPATIENT
Start: 2023-08-30 | End: 2023-09-29

## 2023-08-30 RX ORDER — DEXAMETHASONE SODIUM PHOSPHATE 4 MG/ML
INJECTION, SOLUTION INTRA-ARTICULAR; INTRALESIONAL; INTRAMUSCULAR; INTRAVENOUS; SOFT TISSUE PRN
Status: DISCONTINUED | OUTPATIENT
Start: 2023-08-30 | End: 2023-08-30 | Stop reason: SDUPTHER

## 2023-08-30 RX ORDER — COLCHICINE 0.6 MG/1
0.6 TABLET ORAL 2 TIMES DAILY PRN
Qty: 30 TABLET | Refills: 0 | Status: SHIPPED | OUTPATIENT
Start: 2023-08-30

## 2023-08-30 RX ORDER — ACETAMINOPHEN 500 MG
1000 TABLET ORAL ONCE
Status: DISCONTINUED | OUTPATIENT
Start: 2023-08-30 | End: 2023-08-30 | Stop reason: HOSPADM

## 2023-08-30 RX ORDER — HYDROMORPHONE HYDROCHLORIDE 1 MG/ML
0.5 INJECTION, SOLUTION INTRAMUSCULAR; INTRAVENOUS; SUBCUTANEOUS EVERY 5 MIN PRN
Status: DISCONTINUED | OUTPATIENT
Start: 2023-08-30 | End: 2023-08-30 | Stop reason: HOSPADM

## 2023-08-30 RX ADMIN — Medication 1 MG: at 07:45

## 2023-08-30 RX ADMIN — DEXAMETHASONE SODIUM PHOSPHATE 4 MG: 4 INJECTION, SOLUTION INTRAMUSCULAR; INTRAVENOUS at 07:35

## 2023-08-30 RX ADMIN — PROPOFOL 30 MG: 10 INJECTION, EMULSION INTRAVENOUS at 07:52

## 2023-08-30 RX ADMIN — ROCURONIUM BROMIDE 10 MG: 10 SOLUTION INTRAVENOUS at 07:29

## 2023-08-30 RX ADMIN — PROPOFOL 170 MG: 10 INJECTION, EMULSION INTRAVENOUS at 07:29

## 2023-08-30 RX ADMIN — HEPARIN SODIUM 1200 UNITS/HR: 10000 INJECTION, SOLUTION INTRAVENOUS at 07:57

## 2023-08-30 RX ADMIN — PHENYLEPHRINE HYDROCHLORIDE 30 MCG/MIN: 10 INJECTION INTRAVENOUS at 07:40

## 2023-08-30 RX ADMIN — HEPARIN SODIUM 1000 UNITS: 1000 INJECTION, SOLUTION INTRAVENOUS; SUBCUTANEOUS at 08:21

## 2023-08-30 RX ADMIN — PROTAMINE SULFATE 50 MG: 10 INJECTION, SOLUTION INTRAVENOUS at 10:17

## 2023-08-30 RX ADMIN — ISOPROTERENOL HYDROCHLORIDE 10 MCG/MIN: 0.2 INJECTION, SOLUTION INTRAMUSCULAR; INTRAVENOUS at 09:03

## 2023-08-30 RX ADMIN — HEPARIN SODIUM 1000 UNITS: 1000 INJECTION, SOLUTION INTRAVENOUS; SUBCUTANEOUS at 08:44

## 2023-08-30 RX ADMIN — ONDANSETRON HYDROCHLORIDE 4 MG: 2 INJECTION, SOLUTION INTRAMUSCULAR; INTRAVENOUS at 09:15

## 2023-08-30 RX ADMIN — LIDOCAINE HYDROCHLORIDE 80 MG: 20 INJECTION, SOLUTION EPIDURAL; INFILTRATION; INTRACAUDAL; PERINEURAL at 07:29

## 2023-08-30 RX ADMIN — GLYCOPYRROLATE 0.2 MG: 0.2 INJECTION INTRAMUSCULAR; INTRAVENOUS at 07:45

## 2023-08-30 RX ADMIN — HEPARIN SODIUM 12000 UNITS: 1000 INJECTION, SOLUTION INTRAVENOUS; SUBCUTANEOUS at 07:57

## 2023-08-30 RX ADMIN — SODIUM CHLORIDE: 9 INJECTION, SOLUTION INTRAVENOUS at 07:24

## 2023-08-30 NOTE — PROGRESS NOTES
36  Pt arrived to recovery room post procedure. GENERAL ANESTHESIA:  RN at bedside for first 20 min obtaining q 5 min vitals with temps. 1100  First 20 min of recovery is complete. Pt placed in q 15 min vitals. GROIN ACCESS:  Bed Rest for 2 hours. 1116  EKG Obtained    1130  Pt Sat up after 1 hour  Pt eating and tolerating PO diet well. 200  RN assisted pt to stand up; no bleeding and no hematoma at site(s)  Pt walked to the restroom and voided successfully. Site(s) have no bleeding and no hematoma present    1235  Educated patient about their sedation precautions such as not driving, operating any machinery, or signing legal documents 24 hours post procedure. Reviewed discharge instructions, including medications and site care using the teach back method. Answered all questions. Verbalized understanding. Pt had an opportunity to ask questions. Pt is also aware of how to handle a site if it starts bleeding or develops a hematoma. [address] [place] removed pt IV.    1246  Dressings at both sites changed, MD wants to come assess sites before pt discharge. 1300  Pt getting dressed  RN called pt ride home. 1320  Pt discharged to ride at main entrance of hospital via wheel chair by RN.   Pt discharged withDischarge Paperwork, Extra Band Aids, Clothing, Cell Phone, and \"[employer]

## 2023-08-30 NOTE — H&P
Cardiac Electrophysiology H&P    NAME: Palmer Sneed   :  1955  MRM:  [id number]    Date:  2023         Assessment and Plan:     1. Persistent atrial fibrillation ([address])  2. Muscular dystrophy ([address])  3. History of CVA (cerebrovascular accident)  4. Anticoagulation adequate            Persistent atrial fibrillation  -Complicated by acute CVA. -Unclear duration of A-fib prior to hospitalization in 2022. She ultimately had ongoing breakthrough arrhythmia despite flecainide therapy and eventually amiodarone therapy. -S/p PVI, CTI ablation on 2022  - Monitor 1 year post ablation demonstrated Significant atrial flutter seen with burden of 56.9%, longest episode lasting for 2 days and 1 hour. 67 episodes of pause seen, longest duration of 2.6 seconds. - Bradycardia and Wenckebach limits long-term beta-blockade  - No longer on amiodarone since 2022  - Prior sleep study negative for sleep apnea  - Continue Xarelto 20 mg daily. She is dealing with the high cost of Xarelto as well as in the history of muscular dystrophy, elevated risk of falls. She declines Watchman at this time as it would not prevent DVT. -The implication regarding the diagnosis of AF was explained to the patient in great detail including the associated risk of CVA, AF-mediated cardiomyopathy, and progression into persistent/chronic AF.  - Literature from the EAST-AFNET 4 Trial demonstrated that early rhythm control management in patients with early diagnosis of atrial fibrillation (median time of diagnosis, 36 days) resulted in reduction in cardiovascular mortality, stroke, or hospitalization with worsening heart failure or ACS. - I have discussed options including continued medical therapy and ablation in detail.  I have discussed the risks of left atrial ablation including vascular complications, infection, MI, death, stroke, cardiac tamponade, esophageal fistula, phrenic nerve paralysis, PV stenosis and need for a 2nd procedure with the pt. Patient reports complete understanding of discussions and recommendations and wishes to proceed with the procedure. --Plan to proceed with ablation of atrial fibrillation & atypical atrial flutter as scheduled. Anticoagulation  -On Xarelto for thromboembolic prophylaxis. CHADS-VASc score of 4 (CVA, female, Age>65). Cost is high, but she's uninterested in Agios Sergios, as it doesn't provide any protection against DVT. -Will need indefinite anticoagulation     Muscular dystrophy  -Well-controlled.    -No evidence of dilated cardiomyopathy from cardiac MRI done in 2022     History of CVA  - Secondary to atrial fibrillation; fortunate to not have significant residual deficits status post CVA and tPA   - Continue Xarelto for thromboembolic prophylaxis            Subjective:         Gildardo Basurto is a 74 y.o. patient who presents for planned ablation of persistent atrial fibrillation & atypical AFL. She reports occasional GUILLAUME & nocturnal palpitations. Denies chest pain. Ambulates with a walker due to muscular dystrophy. Anticoagulated with Xarelto, denies bleeding issues. Previously discussed Watchman, but she declined that procedure since it wouldn't prevent DVT. Previous:  S/p PVI, CTI, & epicardial PV connection ablation in the right muna 06/21/2022. CVA s/p thrombolysis 01/2022. Prior [facility] patient. I independently review internal and external records of most recent labs, EKGs, event monitors or Holters, and imaging including most recent echocardiograms and stress test.    -[facility] records reviewed   - Acute CVA underwent thrombolysis in 2022   - Cardiac MRI demonstrated LVEF of 55%, mild mitral regurgitation, mild to moderate tricuspid regurgitation, evidence of late gadolinium enhancement  in the focal area of subendocardial basal inferior lateral, nonischemic. Review of Systems:   12 point review of systems was performed.  All negative except for

## 2023-08-30 NOTE — PROGRESS NOTES
EP LAB to Recovery Room Report    Procedure: Afib Ablation    MD: Armida    Pre-procedure heart rhythm: SR  Verbal Report given to Recovery Nurse on patient being transferred to Recovery Room for routine post-op. Patient stable upon transfer to . Pt had General Anesthesia. Vitals, mental status, MAR, procedural summary discussed with recovery RN. Patient cardioverted x 2 during procedure. A total of Heparin 14,000 units given. Protamine 50mg given post ablation. Post-procedure heart rhythm: SR      Sheaths:    1018: Right femoral vein 8.5 fr sheath removed, closed with perclose. 1022: Left femoral vein 8fr sheath removed, closed with Perclose. 1021: Left femoral vein 11fr sheath removed, closed with Perclose.

## 2023-08-30 NOTE — PROCEDURES
RADIOFREQUENCY ATRIAL FIBRILLATION ABLATION  POSTERIOR WALL ISOLATION  EPICARDIAL PV CONNECTION ABLATION  TYPICAL ATRIAL FLUTTER ABLATION  ATRIAL TACHYCARDIA ABLATION    Procedure Date: 08/30/23   Lab Physician: Jacqui Dukes MD    Indications:  75 yo woman with a history of muscular dystrophy, history of CVA, history of persistent atrial fibrillation s/p prior PVI/CTI ablation (6/21/22) with bradycardia limiting AAD now with recurrent AF referred for redo-Afib ablation. SHEATH INSERTION  All sheaths were placed using the modified Seldinger technique with ultrasound guided assistance  Right Femoral Vein: 8.5Fr Agilis sheath  Left Femoral Vein: 8Fr and a 11F sheath    CATHETER INSERTION  Catheters were advanced to the following positions using fluoroscopic guidance:  Coronary Sinus: : CloudOn Bidirectional Decapolar  LA: CloudOn OctaRay Mapping catheter  Ablation: CloudOn ST/SF D/F ablation catheter  ICE in RA/RV: CloudOn Intracardiac Ultrasound    PROCEDURE NARRATIVE:  EPS and RFA were discussed with the patient in great detail. The risks of bleeding, infection, vascular injury, pulmonary embolus, cardiac perforation, heart block necessitating pacemaker insertion, stroke, MI and death were among those discussed. Alternatives were reviewed including the option of no therapy. All questions were answered. The patient agreed to proceed. Written informed consent was obtained from the patient after a full explanation of the risks and benefits of the procedure. The patient was brought to the lab in the fasting state. Continuous electrocardiographic and hemodynamic monitoring was initiated. The patient was prepped and draped in the usual sterile fashion. General anesthesia with intubation and mechanical conventional ventilation was used. Anesthesia staff performed the intubation and monitoring during the case.   Esophageal temperature monitoring was performed with the CIRCA esophageal temperature probe throughout the

## 2023-08-30 NOTE — PROGRESS NOTES
Cardiac Cath Lab Recovery Arrival Note:      Jesika Tinoco arrived to Cardiac Cath Lab, Recovery Area. Staff introduced to patient. Patient identifiers verified with NAME and DATE OF BIRTH. Procedure verified with patient. Consent forms reviewed and signed by patient or authorized representative and verified. Allergies verified. Patient and family oriented to department. Patient and family informed of procedure and plan of care. Questions answered with review. Patient prepped for procedure, per orders from physician, prior to arrival.    Patient on cardiac monitor, non-invasive blood pressure, SPO2 monitor. On RA. Patient is A&Ox 4. Patient reports no complaints. Patient in stretcher, in low position, with side rails up, call bell within reach, patient instructed to call if assistance as needed. Patient prep in: Fast Track 2. Patient family has pager #   Family in: Lisseth Sal,  in waiting room [phone].    Prep by: Letty Schaeffer Tanya Montes De Oca/Mother

## 2023-08-30 NOTE — PROGRESS NOTES
Cardiac Cath Lab Procedure Area Arrival Note:    Jesika Tinoco arrived to Cardiac Cath Lab, Procedure Area. Patient identifiers verified with NAME and DATE OF BIRTH. Procedure verified with patient. Consent forms verified. Allergies verified. Patient informed of procedure and plan of care. Questions answered with review. Patient voiced understanding of procedure and plan of care. Patient on cardiac monitor, non-invasive blood pressure, SPO2 monitor. On stretcher to room 3 for a EP study and ablation of a fib. Patient under care of CRNA for general anesthesia. Patient status doing well without problems. Patient is A&Ox 4. Patient reports no complaints. Patient medicated during procedure with orders obtained and verified by Dr. Almaz Fernandez. Refer to patients Cardiac Cath Lab PROCEDURE REPORT for vital signs, assessment, status, and response during procedure, printed at end of case. Printed report on chart or scanned into chart.

## 2023-09-05 ENCOUNTER — OFFICE VISIT (OUTPATIENT)
Age: 68
End: 2023-09-05
Payer: MEDICARE

## 2023-09-05 ENCOUNTER — TELEPHONE (OUTPATIENT)
Age: 68
End: 2023-09-05

## 2023-09-05 DIAGNOSIS — Z86.79 S/P ABLATION OF ATRIAL FIBRILLATION: Primary | ICD-10-CM

## 2023-09-05 DIAGNOSIS — Z98.890 S/P ABLATION OF ATRIAL FIBRILLATION: Primary | ICD-10-CM

## 2023-09-05 PROCEDURE — 99212 OFFICE O/P EST SF 10 MIN: CPT | Performed by: NURSE PRACTITIONER

## 2023-09-05 NOTE — PROGRESS NOTES
Patient seen for left groin site discomfort. Site without erythema or exudate. No ecchymosis. Likely tight from internal stitches. No visible abnormality. She will call for worsening symptoms but do supportive care with Tylenol. F/u as scheduled.

## 2023-09-05 NOTE — TELEPHONE ENCOUNTER
Received call from patient, ID verified using two patient identifiers. Patient post ablation with Dr. Nazia Mcallister on 8/30/23. C/o pain at left groin site since right after the ablation, states she removed the dressing on Sunday am and noticed a \"dime size\" open area at the left groin incision site. States the right groin site is not causing her any pain and the incision is closed. Scheduled patient to see Shantel Walton @ 11 am this am to have groin site evaluated. Patient appreciative of appointment.      Future Appointments   Date Time Provider [address] [place] [address]   9/5/2023 11:00 AM OLENA Tran NP BS AMB   10/6/2023 11:00 AM MD LAKSHMI LopezF BS AMB

## 2023-09-11 ENCOUNTER — TELEPHONE (OUTPATIENT)
Age: 68
End: 2023-09-11

## 2023-09-11 NOTE — TELEPHONE ENCOUNTER
Received call from patient, ID verified using two patient identifiers. Ms. Rachell Freeman is calling because she is continuing to have left groin pain post ablation. She had an AFIB ablation with Dr. Leigha Vega on 8/30/23. She saw Oksana Cagle NP on 9/5/23 for what she thought was a hole at the left groin puncture site. Per Casimiro, the site was intact but it appeared that the internal stitch had grabbed some skin and was causing some puckering or pulling in at the site. Ms. Rachell Freeman has been taking hot showers, using hot compresses, and ice as Casimiro recommended with minimal to no relief. She states the area feels like there is something pulling. It is extremely painful. She is unable to wear underwear or pants. She is asking what can be done to help her. Advised her that I will notify Dr. Leigha Vega of her symptoms and call her back once we receive his recommendations. He is in procedures at [facility] today so we may not receive an answer immediately. Patient verbalized understanding and will call back with any other questions or concerns.

## 2023-09-12 ENCOUNTER — OFFICE VISIT (OUTPATIENT)
Age: 68
End: 2023-09-12

## 2023-09-12 DIAGNOSIS — I48.0 PAF (PAROXYSMAL ATRIAL FIBRILLATION) (HCC): Primary | ICD-10-CM

## 2023-09-12 RX ORDER — CEPHALEXIN 500 MG/1
500 CAPSULE ORAL 3 TIMES DAILY
Qty: 21 CAPSULE | Refills: 0 | Status: SHIPPED | OUTPATIENT
Start: 2023-09-12 | End: 2023-09-19

## 2023-09-12 NOTE — PROGRESS NOTES
Left groin suture cut.  No evidence of induration, erythema, or drainage  - recommended prn Tylenol and Ibuprofen  - Keflex 500 mg TID x7 days

## 2023-09-12 NOTE — TELEPHONE ENCOUNTER
MD Latasha Bowen, DELTA    If she is interested, I'm in the office this week on Tuesday or Thursday, no need for a visit, if she can have a nurse visit and I can come in to take a look at the stitch, we sometime can remove it. Thanks. Called patient, ID verified using two patient identifiers. Notified of above recommendations. Patient would like to be seen today. Nurse visit appointment made.     Future Appointments   Date Time Provider [address] [place] [address]   9/12/2023 11:00 AM MD ISRA Bowen   10/6/2023 11:00 AM MD BUTCH Bowen AMB

## 2023-09-21 ENCOUNTER — TELEPHONE (OUTPATIENT)
Age: 68
End: 2023-09-21

## 2023-09-21 NOTE — TELEPHONE ENCOUNTER
An Magdaleno Huddleston MD    See if she can come in tomorrow and I can take a look at it. Thanks. Called patient, ID verified using two patient identifiers. Patient agreeable. Appointment scheduled.     Future Appointments   Date Time Provider [facility]   9/22/2023 11:00 AM MD BUTCH Dong BS AMB   10/6/2023 11:00 AM MD BUTCH Dong BS AMB

## 2023-09-21 NOTE — TELEPHONE ENCOUNTER
Pt is calling to let the doctor know that the area where he cut the stitch that she had has helped. But she also is having sensitivity in the area and there is a hole there. Pt said the hole has not closed up and she would like to know if the doctor would like to look at the site again.     [phone]

## 2023-09-21 NOTE — TELEPHONE ENCOUNTER
Returned patient call, ID verified using two patient identifiers. Ms. Rachell Freeman is calling because she had a suture clipped in her groin last week and she is still having some issues. She states the area feels much better but she is still having a lot of sensitivity. She says the hole is about 0.5\" wide and 0.5\" deep. There is no redness or drainage. She states she did put a q-tip in it this morning to see if there was any drainage. Advised her that she should not stick any q-tips in the site as this could introduce bacteria into the site. She is unable to use a dressing over the area because she feels like when she removes it there is pulling and she is afraid she is going to cause further problems at the site. She has been unable to wear underwear or pants due to the location and rubbing. Advised her that it can take some time for the site to close as it most likely needs to be healed from the inside out. She asked how long that will take which I advised there is no way to know definitively. She would like to know if there is any sort of dressing she should use that the area or if a suture needs to be placed. Advised her that I will send a message to Dr. Leigha Vega who is at [facility] in procedures today and call her back with his recommendations. Patient verbalized understanding and will call back with any other questions or concerns.

## 2023-09-22 ENCOUNTER — OFFICE VISIT (OUTPATIENT)
Age: 68
End: 2023-09-22

## 2023-09-22 DIAGNOSIS — I48.0 PAF (PAROXYSMAL ATRIAL FIBRILLATION) (HCC): Primary | ICD-10-CM

## 2023-09-22 RX ORDER — AMIODARONE HYDROCHLORIDE 200 MG/1
TABLET ORAL
Qty: 42 TABLET | Refills: 2 | OUTPATIENT
Start: 2023-09-22

## 2023-09-22 RX ORDER — PANTOPRAZOLE SODIUM 40 MG/1
40 TABLET, DELAYED RELEASE ORAL
Qty: 60 TABLET | Refills: 0 | OUTPATIENT
Start: 2023-09-22 | End: 2023-10-22

## 2023-09-22 NOTE — TELEPHONE ENCOUNTER
Requested Prescriptions     Refused Prescriptions Disp Refills    amiodarone (CORDARONE) 200 MG tablet [Pharmacy Med Name: AMIODARONE  MG TABLET] 42 tablet 2     Sig: TAKE 400 MG BY MOUTH DAILY X 2 WEEKS, THEN 200 MG BY MOUTH DAILY THEREAFTER. Refused By: Ulysess Blue     Reason for Refusal: Patient has requested refill too soon    pantoprazole (PROTONIX) 40 MG tablet [Pharmacy Med Name: PANTOPRAZOLE SOD DR 40 MG TAB] 60 tablet 0     Sig: TAKE 1 TABLET BY MOUTH 2 TIMES DAILY (BEFORE MEALS) FOR 30 DAYS POST ABLATION     Refused By: Edwin Gutierrez     Reason for Refusal: Refill not appropriate     Amiodarone ordered on 8/30/23 with 2 refills    Pantoprazole only prescribed for 30 days post ablation on 8/30/23.

## 2023-10-04 ENCOUNTER — TELEPHONE (OUTPATIENT)
Age: 68
End: 2023-10-04

## 2023-10-04 RX ORDER — RIVAROXABAN 20 MG/1
20 TABLET, FILM COATED ORAL
Qty: 90 TABLET | Refills: 3 | Status: SHIPPED | OUTPATIENT
Start: 2023-10-04

## 2023-10-04 NOTE — TELEPHONE ENCOUNTER
Pt is calling because she is in the donut hole for her Xarelto and she has reached out to [address] [place] [address]. They have a program where they can assist her with getting the medicine at a discount price. Pt needs a new prescription sent to them for Xarelto 20 mg. Pt needs a 90 day rx refill.     [address] [facility] office  [phone] fax    [phone] patient

## 2023-10-04 NOTE — TELEPHONE ENCOUNTER
Requested Prescriptions     Signed Prescriptions Disp Refills    XARELTO 20 MG TABS tablet 90 tablet 3     Sig: Take 1 tablet by mouth Daily with supper     Authorizing Provider: NANCY Menezes     Ordering User: Ileene Curling     Prescription sent to [facility] speciality pharmacy per verbal order Dr. Sebastian Domínguez and patient's request.     Jose Bourgeois [facility]  [address] [facility] hospital [phone]      Called patient, ID verified using two patient identifiers.  Informed patient that prescription for Xarelto was sent as per her request.

## 2023-10-05 PROBLEM — Z79.899 HIGH RISK MEDICATION USE: Status: ACTIVE | Noted: 2023-10-05

## 2023-10-06 ENCOUNTER — OFFICE VISIT (OUTPATIENT)
Age: 68
End: 2023-10-06
Payer: MEDICARE

## 2023-10-06 ENCOUNTER — TELEPHONE (OUTPATIENT)
Age: 68
End: 2023-10-06

## 2023-10-06 VITALS
SYSTOLIC BLOOD PRESSURE: 110 MMHG | WEIGHT: 199 LBS | HEART RATE: 92 BPM | DIASTOLIC BLOOD PRESSURE: 60 MMHG | BODY MASS INDEX: 29.47 KG/M2 | RESPIRATION RATE: 16 BRPM | HEIGHT: 69 IN | OXYGEN SATURATION: 95 %

## 2023-10-06 DIAGNOSIS — Z79.01 ANTICOAGULATION ADEQUATE: ICD-10-CM

## 2023-10-06 DIAGNOSIS — Z79.899 HIGH RISK MEDICATION USE: ICD-10-CM

## 2023-10-06 DIAGNOSIS — Z98.890 S/P ABLATION OF ATRIAL FIBRILLATION: ICD-10-CM

## 2023-10-06 DIAGNOSIS — I48.19 PERSISTENT ATRIAL FIBRILLATION (HCC): Primary | ICD-10-CM

## 2023-10-06 DIAGNOSIS — G71.00 MUSCULAR DYSTROPHY (HCC): ICD-10-CM

## 2023-10-06 DIAGNOSIS — Z86.79 S/P ABLATION OF ATRIAL FIBRILLATION: ICD-10-CM

## 2023-10-06 DIAGNOSIS — Z86.73 HISTORY OF CVA (CEREBROVASCULAR ACCIDENT): ICD-10-CM

## 2023-10-06 PROCEDURE — G8484 FLU IMMUNIZE NO ADMIN: HCPCS | Performed by: INTERNAL MEDICINE

## 2023-10-06 PROCEDURE — G8400 PT W/DXA NO RESULTS DOC: HCPCS | Performed by: INTERNAL MEDICINE

## 2023-10-06 PROCEDURE — G8427 DOCREV CUR MEDS BY ELIG CLIN: HCPCS | Performed by: INTERNAL MEDICINE

## 2023-10-06 PROCEDURE — 99214 OFFICE O/P EST MOD 30 MIN: CPT | Performed by: INTERNAL MEDICINE

## 2023-10-06 PROCEDURE — G8419 CALC BMI OUT NRM PARAM NOF/U: HCPCS | Performed by: INTERNAL MEDICINE

## 2023-10-06 PROCEDURE — 1123F ACP DISCUSS/DSCN MKR DOCD: CPT | Performed by: INTERNAL MEDICINE

## 2023-10-06 PROCEDURE — 93005 ELECTROCARDIOGRAM TRACING: CPT | Performed by: INTERNAL MEDICINE

## 2023-10-06 PROCEDURE — 93010 ELECTROCARDIOGRAM REPORT: CPT | Performed by: INTERNAL MEDICINE

## 2023-10-06 PROCEDURE — 1090F PRES/ABSN URINE INCON ASSESS: CPT | Performed by: INTERNAL MEDICINE

## 2023-10-06 PROCEDURE — 1036F TOBACCO NON-USER: CPT | Performed by: INTERNAL MEDICINE

## 2023-10-06 PROCEDURE — 3017F COLORECTAL CA SCREEN DOC REV: CPT | Performed by: INTERNAL MEDICINE

## 2023-10-06 RX ORDER — IBUPROFEN 200 MG
200 TABLET ORAL EVERY 6 HOURS PRN
COMMUNITY

## 2023-10-06 NOTE — PROGRESS NOTES
Cardiac Electrophysiology Office Follow-up    NAME: Neftali Burciaga   :  1955  MRM:  [id number]    Date:  10/6/2023         Assessment and Plan:     1. Persistent atrial fibrillation (HCC)  -     EKG 12 Lead  2. Anticoagulation adequate  -     EKG 12 Lead  3. S/P ablation of atrial fibrillation  4. High risk medication use  -     EKG 12 Lead  5. History of CVA (cerebrovascular accident)  -     EKG 12 Lead  6. Muscular dystrophy ([address])  -     EKG 12 Lead           Persistent atrial fibrillation  History of persistent atrial fibrillation complicated by acute CVA. Unclear duration of A-fib prior to hospitalization in 2022. She ultimately had ongoing breakthrough arrhythmia despite flecainide therapy and eventually amiodarone therapy. - Underwent PVI, CTI ablation on 2022   - Bradycardia and Wenkebach limits long-term beta-blockade   - Prior sleep study negative for sleep apnea   - Continue Xarelto 20 mg daily. She is dealing with the high cost of Xarelto as well as in the history of muscular dystrophy, elevated risk of falls. She declines Watchman at this time as it would not prevent DVT. - Monitor 1 year post ablation demonstrated Significant atrial flutter seen with burden of 56.9%, longest episode lasting for 2 days and 1 hour. 67 episodes of pause seen, longest duration of 2.6 seconds. - S/p redo PVI, PWI, GP ablation, CTI, stacey terminalis AT with extra PV triggers s/p SVC isolation (23-Huffman)  - Stop amiodarone in two weeks  - Follow up with NP in 3 months  - Follow up with me in 9 months     High risk medication use  We discussed in great detail regarding high risk medication management and the associated risks of antiarrhythmic therapy. Patient reports full understanding of recommendations. - EKG today shows QTC of 419 ms  - Stop amiodarone in 2 weeks as above     Anticoagulation  On Xarelto for thromboembolic prophylaxis.  CHADS-VASc score of 4 (CVA, female, Age>65)   -Will

## 2023-10-06 NOTE — TELEPHONE ENCOUNTER
Requested Prescriptions     Signed Prescriptions Disp Refills    apixaban (ELIQUIS) 5 MG TABS tablet 56 tablet 0     Sig: Take 1 tablet by mouth 2 times daily     Authorizing Provider: NANCY ALVARADO     Ordering User: Yarelis Salcedo     Samples provided per verbal order Dr. Corazon Hagen. Patient will switch back to Xarelto once she receives her mail order supply.    [id number]  June 2025  BMS

## 2023-10-06 NOTE — TELEPHONE ENCOUNTER
Requested Prescriptions     Signed Prescriptions Disp Refills    rivaroxaban (XARELTO) 20 MG TABS tablet 7 tablet 0     Sig: Take 1 tablet by mouth daily (with breakfast)     Authorizing Provider: NANCY ALVARADO     Ordering User: PAULINA WILHELM     7 day supply of Xarelto 20 mg sent to local pharmacy  per verbal order Dr. Claudy Marsh until patient receives order from [facility] speciality pharmacy.

## 2023-10-06 NOTE — PATIENT INSTRUCTIONS
- Stop amiodarone in 2 weeks   - In place of Xarelto when you run out, take Eliquis 5 mg twice a day   - FU with NP in 3 months  - FU with Dr. Brisa Subramanian in 9 months

## 2023-10-19 RX ORDER — AMIODARONE HYDROCHLORIDE 200 MG/1
TABLET ORAL
Qty: 42 TABLET | Refills: 2 | OUTPATIENT
Start: 2023-10-19

## 2023-10-19 NOTE — TELEPHONE ENCOUNTER
Requested Prescriptions     Refused Prescriptions Disp Refills    amiodarone (CORDARONE) 200 MG tablet [Pharmacy Med Name: AMIODARONE  MG TABLET] 42 tablet 2     Sig: TAKE 400 MG BY MOUTH DAILY X 2 WEEKS, THEN 200 MG BY MOUTH DAILY THEREAFTER. Refused By: Shirley Marshall     Reason for Refusal: Refill not appropriate       Medication discontinued on 10/6/23 by Pat Gresham.

## 2024-01-05 NOTE — PROGRESS NOTES
Cardiac Electrophysiology Office Follow-up    NAME: Christina Escobar   :  1955  MRM:  [id number]    Date:  2024       Assessment and Plan:     1. Persistent atrial fibrillation (HCC)  -     EKG 12 Lead  2. Anticoagulation adequate  -     EKG 12 Lead  3. History of CVA (cerebrovascular accident)  -     EKG 12 Lead      Persistent atrial fibrillation  History of persistent atrial fibrillation complicated by acute CVA.  Unclear duration of A-fib prior to hospitalization in 2022.  She ultimately had ongoing breakthrough arrhythmia despite flecainide therapy and eventually amiodarone therapy.   - Underwent PVI, CTI ablation on 2022   - Bradycardia and Wenkebach limits long-term beta-blockade   - Prior sleep study negative for sleep apnea   - Continue Xarelto 20 mg daily.  She is dealing with the high cost of Xarelto as well as in the history of muscular dystrophy, elevated risk of falls. She declines Watchman at this time as it would not prevent DVT.    - Monitor 1 year post ablation demonstrated Significant atrial flutter seen with burden of 56.9%, longest episode lasting for 2 days and 1 hour.  67 episodes of pause seen, longest duration of 2.6 seconds.   - S/p redo PVI, PWI, GP ablation, CTI, stacey terminalis AT with extra PV triggers s/p SVC isolation (23-Armida)  - Follow up with Dr. Huffman in 6 months      Anticoagulation  On Xarelto for thromboembolic prophylaxis. CHADS-VASc score of 4 (CVA, female, Age>65)   -Will need indefinite anticoagulation     Muscular dystrophy  Well-controlled.  No evidence of dilated cardiomyopathy from cardiac MRI done in      History of CVA  - Secondary to atrial fibrillation; fortunate to not have significant residual deficits status post CVA and tPA   - Continue Xarelto for thromboembolic prophylaxis             Subjective:         Christina Escobar is a 68 y.o. patient who is seen for follow up of  AF.     Prior patient of [facility], transitioning care to [facility]

## 2024-01-08 ENCOUNTER — OFFICE VISIT (OUTPATIENT)
Age: 69
End: 2024-01-08
Payer: MEDICARE

## 2024-01-08 VITALS
RESPIRATION RATE: 16 BRPM | HEIGHT: 69 IN | SYSTOLIC BLOOD PRESSURE: 122 MMHG | WEIGHT: 197.2 LBS | OXYGEN SATURATION: 96 % | HEART RATE: 106 BPM | BODY MASS INDEX: 29.21 KG/M2 | DIASTOLIC BLOOD PRESSURE: 80 MMHG

## 2024-01-08 DIAGNOSIS — Z86.73 HISTORY OF CVA (CEREBROVASCULAR ACCIDENT): ICD-10-CM

## 2024-01-08 DIAGNOSIS — Z79.01 ANTICOAGULATION ADEQUATE: ICD-10-CM

## 2024-01-08 DIAGNOSIS — I48.19 PERSISTENT ATRIAL FIBRILLATION (HCC): Primary | ICD-10-CM

## 2024-01-08 PROCEDURE — 99214 OFFICE O/P EST MOD 30 MIN: CPT | Performed by: NURSE PRACTITIONER

## 2024-01-08 PROCEDURE — G8419 CALC BMI OUT NRM PARAM NOF/U: HCPCS | Performed by: NURSE PRACTITIONER

## 2024-01-08 PROCEDURE — 1036F TOBACCO NON-USER: CPT | Performed by: NURSE PRACTITIONER

## 2024-01-08 PROCEDURE — G8484 FLU IMMUNIZE NO ADMIN: HCPCS | Performed by: NURSE PRACTITIONER

## 2024-01-08 PROCEDURE — 3017F COLORECTAL CA SCREEN DOC REV: CPT | Performed by: NURSE PRACTITIONER

## 2024-01-08 PROCEDURE — 93010 ELECTROCARDIOGRAM REPORT: CPT | Performed by: NURSE PRACTITIONER

## 2024-01-08 PROCEDURE — G8427 DOCREV CUR MEDS BY ELIG CLIN: HCPCS | Performed by: NURSE PRACTITIONER

## 2024-01-08 PROCEDURE — 1123F ACP DISCUSS/DSCN MKR DOCD: CPT | Performed by: NURSE PRACTITIONER

## 2024-01-08 PROCEDURE — 93005 ELECTROCARDIOGRAM TRACING: CPT | Performed by: NURSE PRACTITIONER

## 2024-01-08 PROCEDURE — G8400 PT W/DXA NO RESULTS DOC: HCPCS | Performed by: NURSE PRACTITIONER

## 2024-01-08 PROCEDURE — 1090F PRES/ABSN URINE INCON ASSESS: CPT | Performed by: NURSE PRACTITIONER

## 2024-01-08 RX ORDER — DICYCLOMINE HCL 20 MG
20 TABLET ORAL AS NEEDED
COMMUNITY
Start: 2023-10-25

## 2024-01-08 RX ORDER — AZELASTINE 1 MG/ML
1 SPRAY, METERED NASAL 2 TIMES DAILY
COMMUNITY

## 2024-01-08 NOTE — PROGRESS NOTES
Room #: EP 3    Chief Complaint   Patient presents with    Atrial Fibrillation     /80 (Site: Left Upper Arm, Position: Sitting, Cuff Size: Medium Adult)   Pulse (!) 106   Resp 16   Ht 1.753 m (5' 9\")   Wt 89.4 kg (197 lb 3.2 oz)   SpO2 96%   BMI 29.12 kg/m²         Chest pain: NO       1. Have you been to the ER, urgent care clinic outside of [facility] since your last visit?  Hospitalized since your last visit?  NO        Refills:  NO

## 2024-01-15 ENCOUNTER — TELEPHONE (OUTPATIENT)
Age: 69
End: 2024-01-15

## 2024-01-15 NOTE — TELEPHONE ENCOUNTER
Clay Cooper called requesting medication clearance for xrelto 20mg. Pt scheduled to have surgery on 1/18. CC forms were faxed.     # [phone]  Fax # [phone]

## 2024-01-15 NOTE — TELEPHONE ENCOUNTER
Letter drafted per VELVET Banerjee NP and faxed to VANDANA @ fax # [phone] . Confirmation received.

## 2024-01-31 ENCOUNTER — TELEPHONE (OUTPATIENT)
Age: 69
End: 2024-01-31

## 2024-01-31 DIAGNOSIS — Z86.73 PERSONAL HISTORY OF TRANSIENT ISCHEMIC ATTACK (TIA), AND CEREBRAL INFARCTION WITHOUT RESIDUAL DEFICITS: Primary | ICD-10-CM

## 2024-02-01 NOTE — TELEPHONE ENCOUNTER
Returned patient call, ID verified using two patient identifiers.  Asked patient who normally fills her atorvastatin as Dr. Huffman doesn't typically manage this medication.    She states the lipitor was started while she was in the hospital.  She requested her PCP refill this for her but Dr. Frost wanted Dr. Huffman to state whether she needed to continue taking this.    Advised patient that Dr. Huffman is not typically the one to manage cholesterol meds.  This is usually done by a PCP or general cardiologist.  She has attempted to get an appointment with her PCP and doesn't have one for a few months.    Advised Ms. Escobar that I will ask Dr. Huffman/NP if they are agreeable to refill this for her until she can see her PCP.  She will need labs done.  Patient verbalized understanding and will call back with any other questions or concerns.

## 2024-02-02 RX ORDER — ATORVASTATIN CALCIUM 20 MG/1
20 TABLET, FILM COATED ORAL DAILY
Qty: 90 TABLET | Refills: 0 | Status: SHIPPED | OUTPATIENT
Start: 2024-02-02

## 2024-02-02 NOTE — TELEPHONE ENCOUNTER
Jacqui clemente MD Goode, Anabella FORRESTER RN; Nupur Banerjee, OLENA - NP17 hours ago (4:24 PM)       I'm ok to refill the Atorvastatin. Thanks.     Called patient, ID verified using two patient identifiers. Informed patient that Dr. Huffman is agreeable to refill her Atorvastatin for 90 days until she can get in to see her PCP. Also informed her that she needs to have labs done to check her LFT's and Lipids. Patient agreeable to this plan. Lab slips to be faxed to [facility] at [address] [place] [address] fax # [phone]. Patient aware she needs to fast prior to having labs done.     Lab slips faxed, confirmation received.

## 2024-04-10 ENCOUNTER — TELEPHONE (OUTPATIENT)
Age: 69
End: 2024-04-10

## 2024-04-10 RX ORDER — RIVAROXABAN 20 MG/1
20 TABLET, FILM COATED ORAL
Qty: 90 TABLET | Refills: 3 | Status: SHIPPED | OUTPATIENT
Start: 2024-04-10

## 2024-04-10 NOTE — TELEPHONE ENCOUNTER
Requested Prescriptions     Signed Prescriptions Disp Refills    XARELTO 20 MG TABS tablet 90 tablet 3     Sig: Take 1 tablet by mouth Daily with supper     Authorizing Provider: NANCY HUFFMAN     Ordering User: NEELAM MORAES     Refills per verbal order from Dr. Huffman.  Last OV: 1/8/24  Next OV: 7/10/24

## 2024-07-10 ENCOUNTER — OFFICE VISIT (OUTPATIENT)
Age: 69
End: 2024-07-10
Payer: MEDICARE

## 2024-07-10 VITALS
DIASTOLIC BLOOD PRESSURE: 78 MMHG | WEIGHT: 186.4 LBS | OXYGEN SATURATION: 98 % | BODY MASS INDEX: 27.61 KG/M2 | SYSTOLIC BLOOD PRESSURE: 116 MMHG | HEIGHT: 69 IN | RESPIRATION RATE: 16 BRPM | HEART RATE: 98 BPM

## 2024-07-10 DIAGNOSIS — Z79.899 HIGH RISK MEDICATION USE: ICD-10-CM

## 2024-07-10 DIAGNOSIS — Z79.01 ANTICOAGULATION ADEQUATE: ICD-10-CM

## 2024-07-10 DIAGNOSIS — G71.00 MUSCULAR DYSTROPHY (HCC): ICD-10-CM

## 2024-07-10 DIAGNOSIS — I48.19 PERSISTENT ATRIAL FIBRILLATION (HCC): Primary | ICD-10-CM

## 2024-07-10 DIAGNOSIS — Z86.73 HISTORY OF CVA (CEREBROVASCULAR ACCIDENT): ICD-10-CM

## 2024-07-10 PROCEDURE — 1090F PRES/ABSN URINE INCON ASSESS: CPT | Performed by: INTERNAL MEDICINE

## 2024-07-10 PROCEDURE — 1036F TOBACCO NON-USER: CPT | Performed by: INTERNAL MEDICINE

## 2024-07-10 PROCEDURE — 99214 OFFICE O/P EST MOD 30 MIN: CPT | Performed by: INTERNAL MEDICINE

## 2024-07-10 PROCEDURE — G8427 DOCREV CUR MEDS BY ELIG CLIN: HCPCS | Performed by: INTERNAL MEDICINE

## 2024-07-10 PROCEDURE — G8419 CALC BMI OUT NRM PARAM NOF/U: HCPCS | Performed by: INTERNAL MEDICINE

## 2024-07-10 PROCEDURE — 93005 ELECTROCARDIOGRAM TRACING: CPT | Performed by: INTERNAL MEDICINE

## 2024-07-10 PROCEDURE — 3017F COLORECTAL CA SCREEN DOC REV: CPT | Performed by: INTERNAL MEDICINE

## 2024-07-10 PROCEDURE — 93010 ELECTROCARDIOGRAM REPORT: CPT | Performed by: INTERNAL MEDICINE

## 2024-07-10 PROCEDURE — 1123F ACP DISCUSS/DSCN MKR DOCD: CPT | Performed by: INTERNAL MEDICINE

## 2024-07-10 PROCEDURE — G8400 PT W/DXA NO RESULTS DOC: HCPCS | Performed by: INTERNAL MEDICINE

## 2024-07-10 ASSESSMENT — PATIENT HEALTH QUESTIONNAIRE - PHQ9
2. FEELING DOWN, DEPRESSED OR HOPELESS: NOT AT ALL
SUM OF ALL RESPONSES TO PHQ QUESTIONS 1-9: 0
1. LITTLE INTEREST OR PLEASURE IN DOING THINGS: NOT AT ALL
SUM OF ALL RESPONSES TO PHQ9 QUESTIONS 1 & 2: 0
SUM OF ALL RESPONSES TO PHQ QUESTIONS 1-9: 0

## 2024-07-10 NOTE — PATIENT INSTRUCTIONS
Once you're ready to transition to Eliquis, stop Xarelto and start Eliquis 5 mg twice a day  FU with Dr. Huffman in 6 months with 1 week monitor prior

## 2024-07-10 NOTE — PROGRESS NOTES
Room #: 2    Chief Complaint   Patient presents with    Follow-up     9 mo fu    Atrial Fibrillation       Vitals:    07/10/24 0933   BP: 116/78   Site: Left Upper Arm   Position: Sitting   Cuff Size: Medium Adult   Pulse: 98   Resp: 16   SpO2: 98%   Weight: 84.6 kg (186 lb 6.4 oz)   Height: 1.753 m (5' 9\")         Chest pain:  NO    Have you been to the ER, urgent care, or hospitalized outside of [facility] since your last visit?   NO    Refills:  NO

## 2024-07-10 NOTE — PROGRESS NOTES
Cardiac Electrophysiology Office Follow-up    NAME: Christina Escobar   :  1955  MRM:  [id number]    Date:  7/10/2024       Assessment and Plan:     1. Persistent atrial fibrillation (HCC)  -     EKG 12 Lead  2. Muscular dystrophy (HCC)  -     EKG 12 Lead  3. History of CVA (cerebrovascular accident)  -     EKG 12 Lead  4. Anticoagulation adequate  -     EKG 12 Lead  5. High risk medication use  -     EKG 12 Lead        Persistent atrial fibrillation  History of persistent atrial fibrillation complicated by acute CVA.  Unclear duration of A-fib prior to hospitalization in 2022.  She ultimately had ongoing breakthrough arrhythmia despite flecainide therapy and eventually amiodarone therapy.   - Underwent PVI, CTI ablation on 2022   - Bradycardia and Wenkebach limits long-term beta-blockade   - Prior sleep study negative for sleep apnea   - Continue Xarelto 20 mg daily.  She is dealing with the high cost of Xarelto as well as in the history of muscular dystrophy, elevated risk of falls. She declines Watchman at this time as it would not prevent DVT.    - had recurrent AT/AF now S/p redo PVI, PWI, GP ablation, CTI, stacey terminalis AT with extra PV triggers s/p SVC isolation (23-Huffman)  -  I'm pleased to hear how well the patient has done post ablation. We spoke in great detail regarding the importance of multidisciplinary management of AFib and the role of risk factors modification in preventing recurrent AF including focusing on diet, weight loss, control of blood pressure, glycemic control, PEDRO diagnosis and treatment, and medication compliance.  - off of AAD  - Once you're ready to transition to Eliquis, stop Xarelto and start Eliquis 5 mg twice a day  - FU with me in 6 months with 1 week monitor prior     Anticoagulation  On Xarelto for thromboembolic prophylaxis. CHADS-VASc score of 4 (CVA, female, Age>65)   -Will need indefinite anticoagulation     Muscular dystrophy  Well-controlled.  No

## 2024-12-09 ENCOUNTER — TELEPHONE (OUTPATIENT)
Age: 69
End: 2024-12-09

## 2024-12-09 NOTE — TELEPHONE ENCOUNTER
Enrolled with [employer] - Ordered and being shipped to patient's home address on file.  ETA within 5-7 business days.     Norma Mazariegos Carrie; Brook Mendiola         Previous Messages       ----- Message -----  From: Anabella Shahid RN  Sent: 7/10/2024  10:16 AM EDT  To: Brook Mazariegos    - FU with me in 6 months with 1 week monitor prior - for AFIB    Future Appointments  1/22/2025  9:00 AM    Jacqui Huffman MD              [facility]               BS AMB

## 2024-12-12 ENCOUNTER — TELEPHONE (OUTPATIENT)
Age: 69
End: 2024-12-12

## 2024-12-12 NOTE — TELEPHONE ENCOUNTER
Returned patient call, ID verified using two patient identifiers.  Advised Ms. Escobar that those medication will not interfere with her heart monitor.  She can apply it at her convenience.    Patient verbalized understanding and will call back with any other questions or concerns.    Future Appointments   Date Time Provider Department Center   1/22/2025  9:20 AM Jacqui Huffman MD [facility] BS AMB
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
## 2025-01-21 NOTE — PROGRESS NOTES
Cardiac Electrophysiology Office Follow-up    NAME: Christina Escobar   :  1955  MRM:  [id number]    Date:  2025       Assessment and Plan:     1. Persistent atrial fibrillation (HCC)  -     EKG 12 Lead  -     CBC; Future  -     Basic Metabolic Panel; Future  -     CTA CHEST W WO CONTRAST; Future  2. Anticoagulation adequate  -     CBC; Future  -     Basic Metabolic Panel; Future  -     CTA CHEST W WO CONTRAST; Future  3. History of CVA (cerebrovascular accident)  -     CBC; Future  -     Basic Metabolic Panel; Future  -     CTA CHEST W WO CONTRAST; Future  4. Muscular dystrophy (HCC)  -     CBC; Future  -     Basic Metabolic Panel; Future  -     CTA CHEST W WO CONTRAST; Future      Persistent atrial fibrillation  History of persistent atrial fibrillation complicated by acute CVA.  Unclear duration of A-fib prior to hospitalization in 2022.  She ultimately had ongoing breakthrough arrhythmia despite flecainide therapy and eventually amiodarone therapy.   - Underwent PVI, CTI ablation on 2022   - Bradycardia and Wenkebach limits long-term beta-blockade   - Prior sleep study negative for sleep apnea   - Continue Xarelto 20 mg daily.  She is dealing with the high cost of Xarelto as well as in the history of muscular dystrophy, elevated risk of falls. She declines Watchman at this time as it would not prevent DVT.    - had recurrent AT/AF now S/p redo PVI, PWI, GP ablation, CTI, stacey terminalis AT with extra PV triggers s/p SVC isolation (23-Huffman)  -  I'm pleased to hear how well the patient has done post ablation. We spoke in great detail regarding the importance of multidisciplinary management of AFib and the role of risk factors modification in preventing recurrent AF including focusing on diet, weight loss, control of blood pressure, glycemic control, PEDRO diagnosis and treatment, and medication compliance.  - off of AAD  - Once you're ready to transition to Eliquis, stop Xarelto and

## 2025-01-22 ENCOUNTER — OFFICE VISIT (OUTPATIENT)
Age: 70
End: 2025-01-22
Payer: MEDICARE

## 2025-01-22 ENCOUNTER — TELEPHONE (OUTPATIENT)
Age: 70
End: 2025-01-22

## 2025-01-22 VITALS
WEIGHT: 189.6 LBS | HEART RATE: 108 BPM | BODY MASS INDEX: 28.08 KG/M2 | SYSTOLIC BLOOD PRESSURE: 118 MMHG | HEIGHT: 69 IN | DIASTOLIC BLOOD PRESSURE: 80 MMHG

## 2025-01-22 DIAGNOSIS — Z86.73 HISTORY OF CVA (CEREBROVASCULAR ACCIDENT): ICD-10-CM

## 2025-01-22 DIAGNOSIS — I48.19 PERSISTENT ATRIAL FIBRILLATION (HCC): Primary | ICD-10-CM

## 2025-01-22 DIAGNOSIS — G71.00 MUSCULAR DYSTROPHY (HCC): ICD-10-CM

## 2025-01-22 DIAGNOSIS — I63.9 IMPENDING CEREBROVASCULAR ACCIDENT (HCC): ICD-10-CM

## 2025-01-22 DIAGNOSIS — I48.0 PAROXYSMAL ATRIAL FIBRILLATION (HCC): ICD-10-CM

## 2025-01-22 DIAGNOSIS — Z79.01 ANTICOAGULATION ADEQUATE: ICD-10-CM

## 2025-01-22 PROCEDURE — 1123F ACP DISCUSS/DSCN MKR DOCD: CPT | Performed by: INTERNAL MEDICINE

## 2025-01-22 PROCEDURE — 99214 OFFICE O/P EST MOD 30 MIN: CPT | Performed by: INTERNAL MEDICINE

## 2025-01-22 PROCEDURE — G8427 DOCREV CUR MEDS BY ELIG CLIN: HCPCS | Performed by: INTERNAL MEDICINE

## 2025-01-22 PROCEDURE — 1160F RVW MEDS BY RX/DR IN RCRD: CPT | Performed by: INTERNAL MEDICINE

## 2025-01-22 PROCEDURE — 1090F PRES/ABSN URINE INCON ASSESS: CPT | Performed by: INTERNAL MEDICINE

## 2025-01-22 PROCEDURE — 93010 ELECTROCARDIOGRAM REPORT: CPT | Performed by: INTERNAL MEDICINE

## 2025-01-22 PROCEDURE — 1125F AMNT PAIN NOTED PAIN PRSNT: CPT | Performed by: INTERNAL MEDICINE

## 2025-01-22 PROCEDURE — G8400 PT W/DXA NO RESULTS DOC: HCPCS | Performed by: INTERNAL MEDICINE

## 2025-01-22 PROCEDURE — G2211 COMPLEX E/M VISIT ADD ON: HCPCS | Performed by: INTERNAL MEDICINE

## 2025-01-22 PROCEDURE — 1159F MED LIST DOCD IN RCRD: CPT | Performed by: INTERNAL MEDICINE

## 2025-01-22 PROCEDURE — 93005 ELECTROCARDIOGRAM TRACING: CPT | Performed by: INTERNAL MEDICINE

## 2025-01-22 PROCEDURE — 1036F TOBACCO NON-USER: CPT | Performed by: INTERNAL MEDICINE

## 2025-01-22 PROCEDURE — G8419 CALC BMI OUT NRM PARAM NOF/U: HCPCS | Performed by: INTERNAL MEDICINE

## 2025-01-22 PROCEDURE — 3017F COLORECTAL CA SCREEN DOC REV: CPT | Performed by: INTERNAL MEDICINE

## 2025-01-22 RX ORDER — PHENTERMINE HYDROCHLORIDE 15 MG/1
15 CAPSULE ORAL EVERY MORNING
COMMUNITY

## 2025-01-22 RX ORDER — ATORVASTATIN CALCIUM 20 MG/1
20 TABLET, FILM COATED ORAL DAILY
Qty: 90 TABLET | Refills: 1 | Status: SHIPPED | OUTPATIENT
Start: 2025-01-22

## 2025-01-22 RX ORDER — RIVAROXABAN 20 MG/1
20 TABLET, FILM COATED ORAL
Qty: 90 TABLET | Refills: 3 | Status: SHIPPED | OUTPATIENT
Start: 2025-01-22

## 2025-01-22 NOTE — TELEPHONE ENCOUNTER
Spoke to Pt of Watchman/EUN Procedure with Dr. Huffman on 5/27/25  at 9:30am arriving at 7:30am. Please NPO from Midnight the night prior to the procedure. Please have lab work done Between 4/24/25-5/20/25. Check in at the 1st floor OutPt Registation desk at [facility].    Medications:  Hold Xarelto the day of procedure     VO BY Dr. Huffman/Nurse Anabella PARKER

## 2025-01-22 NOTE — PATIENT INSTRUCTIONS
[facility]  [address], [address]   [address]  [place], [place]  [address]    [place], [place]  [address] [phone] / [phone] Fax  [phone] / [phone] Fax

## 2025-01-24 ENCOUNTER — TELEPHONE (OUTPATIENT)
Age: 70
End: 2025-01-24

## 2025-01-24 NOTE — TELEPHONE ENCOUNTER
Patient called regarding prior visit and she mentioned medication xarelto was prescribed at 20mg and [facility] wants [facility] for 30 day supply and she hasn't been successful with an alternate, can you suggest any programs for medication please follow up with patient at [phone] and in the past patient used xarelto for me however in 2025 it's no longer offered

## 2025-01-27 NOTE — TELEPHONE ENCOUNTER
Nupur Banerjee APRN - [facility]  You; Delia Russell APRN - CNP; Jc Wahl APRN - NP4 minutes ago (3:40 PM)     She can do Pradaxa 150 mg BID     Returned patient call, ID verified using two patient identifiers.  Notified patient of alternative recommendation above.      Ms. Escobar would prefer to stay with the xarelto for now.  She has called around and priced it out and she just needs to reach her deductible for the year.    She does want to cancel her watchman procedure.  She is planning on moving to [place] to be closer to family.  She would prefer to have the procedure done when she can be around her family and have their support.  H&P appt cancelled.  Discussed keeping her follow up appointment in June as a 6 month follow up in case she hasn't moved yet.  Patient agreeable.  Notified patient that she will need to call scheduling and cancel her CT scan.    Asked her to call back if she decides to make medication changes or reschedule her watchman.    Patient verbalized understanding and will call back with any other questions or concerns.      Future Appointments   Date Time Provider Department Center   1/29/2025 10:00 AM [facility] CT 1 [facility]   5/27/2025  9:30 AM [facility]   6/13/2025  1:40 PM Nupur Banerjee APRN - NP [facility]

## 2025-02-17 ENCOUNTER — TELEPHONE (OUTPATIENT)
Age: 70
End: 2025-02-17

## 2025-02-17 NOTE — TELEPHONE ENCOUNTER
Lvm requesting a call back regarding upcoming appointment with Nupur. Wanted to let the patient know that Nupur is now going out to out [facility] and that may be more convenient. If she would like to go to the [facility] please reschedule to sometime at that location around her current appointment date.  Thanks

## 2025-07-28 ENCOUNTER — TELEPHONE (OUTPATIENT)
Age: 70
End: 2025-07-28
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
## (undated) DEVICE — ELECTRODE,RADIOTRANSLUCENT,FOAM,5PK: Brand: MEDLINE

## (undated) DEVICE — CATHETER US 8FR L90CM GRN TIP OVERLAY FOR GE-VIVID I VIVID

## (undated) DEVICE — HEART CATH-MRMC: Brand: MEDLINE INDUSTRIES, INC.

## (undated) DEVICE — COVER CATH ACUNAV ULTRASOUND 5X72IN ANTI STATIC

## (undated) DEVICE — PINNACLE INTRODUCER SHEATH: Brand: PINNACLE

## (undated) DEVICE — PERCLOSE PROGLIDE™ SUTURE-MEDIATED CLOSURE SYSTEM: Brand: PERCLOSE PROGLIDE™

## (undated) DEVICE — CATHETER EP 7FR L115CM 2-8-2MM SPC TIP 2MM 10 ELECTRD F L

## (undated) DEVICE — PAD GROUNDING ADLT ADH FOIL 9FT CORD UNIV

## (undated) DEVICE — INTRODUCER SHTH 11FR CANN L23CM DIL TIP 45MM YEL W/O MINI

## (undated) DEVICE — PRESSURE MONITORING SET: Brand: TRUWAVE

## (undated) DEVICE — PATCH REF EXT FOR CARTO 3 SYS (EA = 6 PACKS)

## (undated) DEVICE — PROVE COVER: Brand: UNBRANDED

## (undated) DEVICE — TUBING PMP FOR CARTO SYS SMARTABLATE

## (undated) DEVICE — THE DV8® RETRACTOR ESOPHAGEAL BALLOON (DV8 RETRACTOR) IS A NON-STERILE, SINGLE-USE, DISPOSABLE ESOPHAGEAL BALLOON RETRACTOR. THE DV8 RETRACTOR IS DEPLOYED ORALLY AND INFLATED INSIDE THE ESOPHAGUS. THE RETRACTOR GENTLY AND EFFECTIVELY RETRACTS THE ESOPHAGUS TO CREATE A STABLE OPERATING FIELD.: Brand: DV8® RETRACTOR

## (undated) DEVICE — CATHETER ABLAT 8FR L115CM 1-6-2MM SPC TIP 3.5MM DF CRV

## (undated) DEVICE — 40CC-15ATM VOLUMINOUS INFLATION DEVICE: Brand: VOLUMINOUS™

## (undated) DEVICE — Device: Brand: RFP-100A CONNECTOR CABLE

## (undated) DEVICE — ROYAL SILK SURGICAL GOWN, XXL: Brand: CONVERTORS

## (undated) DEVICE — SHEATH INTRO 8.5FR L71CM 8.5FR DIL GWIRE L180CM DIA0.032IN

## (undated) DEVICE — MEDI-TRACE CADENCE ADULT, DEFIBRILLATION ELECTRODE -RTS  (10 PR/PK) - PHYSIO-CONTROL: Brand: MEDI-TRACE CADENCE

## (undated) DEVICE — CABLE CATH L10FT RED PIN CONN 34-34 FOR THERMOCOOL

## (undated) DEVICE — WASTE KIT - ST MARY: Brand: MEDLINE INDUSTRIES, INC.

## (undated) DEVICE — ELECTRODE PT RET AD L9FT HI MOIST COND ADH HYDRGEL CORDED

## (undated) DEVICE — CATHETER MAP D CRV 3-3-3-3-3 MM SPC GALAXY OCTARAY

## (undated) DEVICE — Device: Brand: NRG TRANSSEPTAL NEEDLE

## (undated) DEVICE — CABLE CATH L2.7M CONNECTS TO CARTO 3 SYS PIU FOR LASSO ECO